# Patient Record
Sex: FEMALE | Race: WHITE | NOT HISPANIC OR LATINO
[De-identification: names, ages, dates, MRNs, and addresses within clinical notes are randomized per-mention and may not be internally consistent; named-entity substitution may affect disease eponyms.]

---

## 2017-03-22 ENCOUNTER — APPOINTMENT (OUTPATIENT)
Dept: SURGERY | Facility: CLINIC | Age: 67
End: 2017-03-22
Payer: MEDICARE

## 2017-03-22 PROCEDURE — 36415 COLL VENOUS BLD VENIPUNCTURE: CPT

## 2017-03-22 PROCEDURE — 99213 OFFICE O/P EST LOW 20 MIN: CPT | Mod: 25

## 2017-03-24 LAB
T3 SERPL-MCNC: 88 NG/DL
T4 FREE SERPL-MCNC: 1.7 NG/DL
TSH SERPL-ACNC: 0.76 UIU/ML

## 2017-04-25 ENCOUNTER — FORM ENCOUNTER (OUTPATIENT)
Age: 67
End: 2017-04-25

## 2017-04-26 ENCOUNTER — APPOINTMENT (OUTPATIENT)
Dept: ULTRASOUND IMAGING | Facility: CLINIC | Age: 67
End: 2017-04-26

## 2017-04-26 ENCOUNTER — OUTPATIENT (OUTPATIENT)
Dept: OUTPATIENT SERVICES | Facility: HOSPITAL | Age: 67
LOS: 1 days | End: 2017-04-26
Payer: MEDICARE

## 2017-04-26 DIAGNOSIS — Z00.8 ENCOUNTER FOR OTHER GENERAL EXAMINATION: ICD-10-CM

## 2017-04-26 PROCEDURE — 76536 US EXAM OF HEAD AND NECK: CPT

## 2018-01-15 ENCOUNTER — RX RENEWAL (OUTPATIENT)
Age: 68
End: 2018-01-15

## 2018-03-19 ENCOUNTER — APPOINTMENT (OUTPATIENT)
Dept: SURGERY | Facility: CLINIC | Age: 68
End: 2018-03-19
Payer: MEDICARE

## 2018-03-19 PROCEDURE — 99213 OFFICE O/P EST LOW 20 MIN: CPT

## 2018-03-19 RX ORDER — TERCONAZOLE 8 MG/G
0.8 CREAM VAGINAL
Qty: 20 | Refills: 0 | Status: DISCONTINUED | COMMUNITY
Start: 2017-11-01

## 2018-03-19 RX ORDER — DOXYCYCLINE HYCLATE 100 MG/1
100 TABLET ORAL
Qty: 20 | Refills: 0 | Status: DISCONTINUED | COMMUNITY
Start: 2017-11-07

## 2018-03-19 RX ORDER — CEFUROXIME AXETIL 500 MG/1
500 TABLET ORAL
Qty: 20 | Refills: 0 | Status: DISCONTINUED | COMMUNITY
Start: 2017-09-29

## 2019-01-04 ENCOUNTER — RX RENEWAL (OUTPATIENT)
Age: 69
End: 2019-01-04

## 2019-03-06 ENCOUNTER — APPOINTMENT (OUTPATIENT)
Dept: SURGERY | Facility: CLINIC | Age: 69
End: 2019-03-06
Payer: MEDICARE

## 2019-03-06 ENCOUNTER — FORM ENCOUNTER (OUTPATIENT)
Age: 69
End: 2019-03-06

## 2019-03-06 PROCEDURE — 99213 OFFICE O/P EST LOW 20 MIN: CPT

## 2019-03-06 NOTE — PHYSICAL EXAM
[de-identified] : No cervical or subclavicular adenopathy, trachea midline, well-healed incision with right 3 cm nodularity. No other masses noted. Stable exam. [Normal] : cranial nerves 2-12 intact

## 2019-03-06 NOTE — HISTORY OF PRESENT ILLNESS
[de-identified] : Patient underwent a left thyroid lobectomy And partial right thyroid lobectomy in 1995 4 benign multinodular goiter. Last ultrasound May 2015 without appreciable change. Patient denies dysphagia, change in voice or palpitations. Repeat blood work August 2015 TSH is 0.7, total T4 9.8. Patient did not have thyroid levels done during August 2016 physical exam. Denies change in energy or weight.\par Recent jose stable.  denies recent illness.   last US 4/2017 stable

## 2019-03-07 ENCOUNTER — OUTPATIENT (OUTPATIENT)
Dept: OUTPATIENT SERVICES | Facility: HOSPITAL | Age: 69
LOS: 1 days | End: 2019-03-07
Payer: MEDICARE

## 2019-03-07 ENCOUNTER — APPOINTMENT (OUTPATIENT)
Dept: ULTRASOUND IMAGING | Facility: CLINIC | Age: 69
End: 2019-03-07
Payer: MEDICARE

## 2019-03-07 DIAGNOSIS — Z00.8 ENCOUNTER FOR OTHER GENERAL EXAMINATION: ICD-10-CM

## 2019-03-07 PROCEDURE — 76536 US EXAM OF HEAD AND NECK: CPT | Mod: 26

## 2019-03-07 PROCEDURE — 76536 US EXAM OF HEAD AND NECK: CPT

## 2019-03-11 ENCOUNTER — APPOINTMENT (OUTPATIENT)
Dept: SURGERY | Facility: CLINIC | Age: 69
End: 2019-03-11

## 2020-03-16 ENCOUNTER — APPOINTMENT (OUTPATIENT)
Dept: SURGERY | Facility: CLINIC | Age: 70
End: 2020-03-16
Payer: MEDICARE

## 2020-03-16 PROCEDURE — 99213 OFFICE O/P EST LOW 20 MIN: CPT

## 2020-03-16 NOTE — HISTORY OF PRESENT ILLNESS
[de-identified] : Patient underwent a left thyroid lobectomy And partial right thyroid lobectomy in 1995 4 benign multinodular goiter. Last ultrasound May 2015 without appreciable change. Patient denies dysphagia, change in voice or palpitations. Repeat blood work August 2015 TSH is 0.7, total T4 9.8. Denies change in energy or weight.\par Alexandrea 2/2020 stable TFTs.  denies recent illness.   US 3/2019 stable

## 2020-03-16 NOTE — PHYSICAL EXAM
[de-identified] : No cervical or subclavicular adenopathy, trachea midline, well-healed incision with right 3 cm nodularity. No other masses noted. Stable exam. [Normal] : no neck adenopathy [de-identified] : Skin:  normal appearance.  no rash, nodules, vesicles, or erythema,\par Musculoskeletal:  full range of motion and no deformities appreciated\par Neurological:  grossly intact\par Psychiatric:  oriented to person, place and time with appropriate affect

## 2020-06-23 DIAGNOSIS — Z01.818 ENCOUNTER FOR OTHER PREPROCEDURAL EXAMINATION: ICD-10-CM

## 2020-06-27 ENCOUNTER — APPOINTMENT (OUTPATIENT)
Dept: DISASTER EMERGENCY | Facility: CLINIC | Age: 70
End: 2020-06-27

## 2020-06-28 LAB — SARS-COV-2 N GENE NPH QL NAA+PROBE: NOT DETECTED

## 2020-12-02 ENCOUNTER — RESULT REVIEW (OUTPATIENT)
Age: 70
End: 2020-12-02

## 2021-03-30 ENCOUNTER — APPOINTMENT (OUTPATIENT)
Dept: SURGERY | Facility: CLINIC | Age: 71
End: 2021-03-30
Payer: MEDICARE

## 2021-03-30 ENCOUNTER — RESULT REVIEW (OUTPATIENT)
Age: 71
End: 2021-03-30

## 2021-03-30 PROCEDURE — 99213 OFFICE O/P EST LOW 20 MIN: CPT

## 2021-03-30 RX ORDER — AMOXICILLIN 500 MG/1
500 TABLET, FILM COATED ORAL
Qty: 28 | Refills: 0 | Status: DISCONTINUED | COMMUNITY
Start: 2021-03-10

## 2021-03-30 RX ORDER — PRAVASTATIN SODIUM 20 MG/1
20 TABLET ORAL
Qty: 30 | Refills: 0 | Status: ACTIVE | COMMUNITY
Start: 2021-03-15

## 2021-03-30 RX ORDER — ESCITALOPRAM OXALATE 5 MG/1
5 TABLET ORAL
Qty: 30 | Refills: 0 | Status: ACTIVE | COMMUNITY
Start: 2021-02-12

## 2021-03-30 NOTE — HISTORY OF PRESENT ILLNESS
[de-identified] : Patient underwent a left thyroid lobectomy And partial right thyroid lobectomy in 1995 4 benign multinodular goiter. Last ultrasound May 2015 without appreciable change. Patient denies dysphagia, change in voice or palpitations. Repeat blood work August 2015 TSH is 0.7, total T4 9.8. Denies change in energy or weight.\par Patient with hx of multiple prior thyroid surgeries and MNG.  Alexandrea 2/2021 stable TFTs.  denies dysphagia, hoarseness, SOB or neck mass or  recent illness.   US 3/2019 stable.  I have reviewed all old and new data and available images.

## 2021-03-30 NOTE — PHYSICAL EXAM
[Normal] : no neck adenopathy [de-identified] : No cervical or subclavicular adenopathy, trachea midline, well-healed incision with right 3 cm nodularity. No other masses noted. Stable exam. [de-identified] : Skin:  normal appearance.  no rash, nodules, vesicles, or erythema,\par Musculoskeletal:  full range of motion and no deformities appreciated\par Neurological:  grossly intact\par Psychiatric:  oriented to person, place and time with appropriate affect

## 2021-04-06 ENCOUNTER — OUTPATIENT (OUTPATIENT)
Dept: OUTPATIENT SERVICES | Facility: HOSPITAL | Age: 71
LOS: 1 days | End: 2021-04-06
Payer: MEDICARE

## 2021-04-06 ENCOUNTER — APPOINTMENT (OUTPATIENT)
Dept: ULTRASOUND IMAGING | Facility: CLINIC | Age: 71
End: 2021-04-06
Payer: MEDICARE

## 2021-04-06 DIAGNOSIS — R89.0 ABNORMAL LEVEL OF ENZYMES IN SPECIMENS FROM OTHER ORGANS, SYSTEMS AND TISSUES: ICD-10-CM

## 2021-04-06 PROCEDURE — 76536 US EXAM OF HEAD AND NECK: CPT | Mod: 26

## 2021-04-06 PROCEDURE — 76536 US EXAM OF HEAD AND NECK: CPT

## 2021-04-15 ENCOUNTER — NON-APPOINTMENT (OUTPATIENT)
Age: 71
End: 2021-04-15

## 2022-04-04 ENCOUNTER — APPOINTMENT (OUTPATIENT)
Dept: ORTHOPEDIC SURGERY | Facility: CLINIC | Age: 72
End: 2022-04-04

## 2022-04-14 ENCOUNTER — APPOINTMENT (OUTPATIENT)
Dept: SURGERY | Facility: CLINIC | Age: 72
End: 2022-04-14
Payer: MEDICARE

## 2022-04-14 PROCEDURE — 99213 OFFICE O/P EST LOW 20 MIN: CPT

## 2022-04-14 NOTE — PHYSICAL EXAM
[de-identified] : No cervical or subclavicular adenopathy, trachea midline, well-healed incision with right 3 cm nodularity. No other masses noted. Stable exam. [Normal] : no neck adenopathy [de-identified] : Skin:  normal appearance.  no rash, nodules, vesicles, or erythema,\par Musculoskeletal:  full range of motion and no deformities appreciated\par Neurological:  grossly intact\par Psychiatric:  oriented to person, place and time with appropriate affect

## 2022-04-14 NOTE — HISTORY OF PRESENT ILLNESS
[de-identified] : Patient underwent a left thyroid lobectomy And partial right thyroid lobectomy in 1995 4 benign multinodular goiter. Last ultrasound May 2015 without appreciable change. Patient denies dysphagia, change in voice or palpitations. Repeat blood work August 2015 TSH is 0.7, total T4 9.8. Denies change in energy or weight.\par Patient with hx of multiple prior thyroid surgeries and MNG.  Jose 2/2021 stable TFTs.  denies dysphagia, hoarseness, SOB or neck mass or  recent illness.   US 4/2021 stable.  denies recent illness. jose 3/2022 TSH 0.75, T4 9.5. I have reviewed all old and new data and available images.

## 2022-04-14 NOTE — ASSESSMENT
[FreeTextEntry1] : no appreciable change on PE or prior imaging, synthroid renewed,  RTo 1 year thyroid US 3/2023.  I have answered their questions to the best of my ability.\par

## 2022-07-18 ENCOUNTER — APPOINTMENT (OUTPATIENT)
Dept: GASTROENTEROLOGY | Facility: CLINIC | Age: 72
End: 2022-07-18

## 2022-07-29 ENCOUNTER — APPOINTMENT (OUTPATIENT)
Dept: GASTROENTEROLOGY | Facility: CLINIC | Age: 72
End: 2022-07-29

## 2022-07-29 VITALS
DIASTOLIC BLOOD PRESSURE: 76 MMHG | SYSTOLIC BLOOD PRESSURE: 120 MMHG | HEART RATE: 74 BPM | WEIGHT: 174 LBS | HEIGHT: 68 IN | OXYGEN SATURATION: 98 % | TEMPERATURE: 97.7 F | BODY MASS INDEX: 26.37 KG/M2

## 2022-07-29 PROCEDURE — 99214 OFFICE O/P EST MOD 30 MIN: CPT

## 2022-07-29 NOTE — HISTORY OF PRESENT ILLNESS
[FreeTextEntry1] : I saw your patient Diana Aguilera in the office today.  Patient is a 72-year-old female who has a history of hypertension,  hypothyroidism and hyperlipidemia.  Patient has been seen in his office for colorectal cancer screening and has known diverticulosis.  The patient had no adenomatous polyps but was noted to have a redundant colon.  Patient's bowel movements are erratic where she may skip 2 to 3 days and is currently noticing intermittent episodes of abdominal cramping followed by several loose bowel movements and incomplete evacuation.  After these attacks the patient does revert back to constipation.  The patient has tried fiber but it made her bloating worse.  There is no blood in the stool or on the toilet tissue.  The patient does admit to being under a lot of stress and notices that this does exacerbate her symptoms.  Patient does not abuse tobacco caffeine or ethanol.  The patient is status post bilateral mastectomy.

## 2022-07-29 NOTE — ASSESSMENT
[FreeTextEntry1] : Patient is a 72-year-old female with a history of hypertension hypothyroidism and hyperlipidemia.  Patient has known diverticulosis and a redundant colon.  The patient has chronic bowel issues and the longevity of the symptoms in the absence of red flag signs such as unexplained weight loss blood in the stool or significant abdominal pain speak for functional bowel issues.  It is likely that if we were able to regulate the patient's bowel movements and avoid constipation she would not swing over to these episodes of abdominal cramping and loose stools.  In this regard I told the patient to start a high potency probiotic, soluble fiber in the form of Benefiber and MiraLAX every other day.  The patient may benefit from an antispasmodic in the future but I am curious to see if regulating her bowel movements prevent these episodes of spasm.  The patient will get back to me with a progress report in 2 to 3 weeks.

## 2022-10-14 ENCOUNTER — APPOINTMENT (OUTPATIENT)
Dept: GASTROENTEROLOGY | Facility: CLINIC | Age: 72
End: 2022-10-14

## 2022-10-14 VITALS
HEIGHT: 68 IN | OXYGEN SATURATION: 96 % | BODY MASS INDEX: 26.67 KG/M2 | WEIGHT: 176 LBS | SYSTOLIC BLOOD PRESSURE: 130 MMHG | HEART RATE: 86 BPM | TEMPERATURE: 97.1 F | DIASTOLIC BLOOD PRESSURE: 84 MMHG

## 2022-10-14 DIAGNOSIS — Z86.79 PERSONAL HISTORY OF OTHER DISEASES OF THE CIRCULATORY SYSTEM: ICD-10-CM

## 2022-10-14 DIAGNOSIS — K58.9 IRRITABLE BOWEL SYNDROME W/OUT DIARRHEA: ICD-10-CM

## 2022-10-14 DIAGNOSIS — R10.11 RIGHT UPPER QUADRANT PAIN: ICD-10-CM

## 2022-10-14 DIAGNOSIS — R10.13 EPIGASTRIC PAIN: ICD-10-CM

## 2022-10-14 DIAGNOSIS — K57.90 DIVERTICULOSIS OF INTESTINE, PART UNSPECIFIED, W/OUT PERFORATION OR ABSCESS W/OUT BLEEDING: ICD-10-CM

## 2022-10-14 PROCEDURE — 99214 OFFICE O/P EST MOD 30 MIN: CPT

## 2022-10-14 NOTE — REVIEW OF SYSTEMS
[Fever] : no fever [Chills] : no chills [Recent Weight Loss (___ Lbs)] : no recent weight loss [Negative] : Respiratory [FreeTextEntry4] : Patient complains of mild discomfort in the left ear the patient has known history of postnasal drip. [FreeTextEntry7] : Transient upper abdominal discomfort.

## 2022-10-14 NOTE — PHYSICAL EXAM
[Alert] : alert [Healthy Appearing] : healthy appearing [No Acute Distress] : no acute distress [Normal] : normal bowel sounds, non-tender, no masses, soft, no no hepato-splenomegaly [de-identified] : TMs were normal bilaterally.  There was mild erythema of the posterior pharynx and nares with no congestion.

## 2022-10-14 NOTE — ASSESSMENT
[FreeTextEntry1] : Mrs. Aguilera is a 72-year-old female with a history of hypertension, hyperlipidemia and hypothyroidism.  From the gastrointestinal perspective the patient has a known redundant colon with irritable bowel and chronic intermittent constipation.  The patient's bowel movements are currently acceptable.  The patient did have a self-limiting episode of upper abdominal discomfort which appears to be more colonic in nature.  It is unlikely that it is biliary or related to the stomach.  Since the discomfort has resolved I do not feel she needs medication at the present time.  For completeness sake the patient was sent for an abdominal sonogram to rule out the remote possibility of atypical biliary or hepatic issues.  The patient will call me with any recurrence of the discomfort.

## 2022-10-14 NOTE — HISTORY OF PRESENT ILLNESS
[FreeTextEntry1] : I saw your patient Diana Aguilera in the office today.  The patient is a 72-year-old female who has a history of hypertension and hyperlipidemia.  The patient is on Synthroid after surgery for multinodular goiter.  Patient has been seen in this office for colorectal cancer screening and has known irritable bowel, diverticulosis and redundant colon.  The patient was seen earlier in the year and given a regimen of MiraLAX along with probiotics which is greatly helped her in her bowel movements.  Patient's lower abdominal discomfort has subsided.  Most recently the patient noticed a band like discomfort in the umbilical area.  There was no associated nausea vomiting fever or chills.  With no relief with the passage of flatus or bowel movements.  The pain has since subsided.  Diana does not abuse tobacco caffeine or ethanol.  The patient had a colonoscopy done less than 5 years ago.  The patient has a history of a bilateral mastectomy.  The patient complains of mild left ear discomfort as well on today's visit.

## 2023-01-19 ENCOUNTER — APPOINTMENT (OUTPATIENT)
Dept: ORTHOPEDIC SURGERY | Facility: CLINIC | Age: 73
End: 2023-01-19
Payer: MEDICARE

## 2023-01-19 ENCOUNTER — APPOINTMENT (OUTPATIENT)
Dept: ORTHOPEDIC SURGERY | Facility: CLINIC | Age: 73
End: 2023-01-19

## 2023-01-19 VITALS — BODY MASS INDEX: 27.28 KG/M2 | WEIGHT: 180 LBS | HEIGHT: 68 IN

## 2023-01-19 DIAGNOSIS — M70.42 PREPATELLAR BURSITIS, LEFT KNEE: ICD-10-CM

## 2023-01-19 DIAGNOSIS — S80.02XA CONTUSION OF LEFT KNEE, INITIAL ENCOUNTER: ICD-10-CM

## 2023-01-19 PROCEDURE — 99203 OFFICE O/P NEW LOW 30 MIN: CPT

## 2023-01-19 PROCEDURE — 73564 X-RAY EXAM KNEE 4 OR MORE: CPT | Mod: LT

## 2023-01-19 NOTE — IMAGING
[Left] : left knee [All Views] : anteroposterior, lateral, skyline, and anteroposterior standing [There are no fractures, subluxations or dislocations. No significant abnormalities are seen] : There are no fractures, subluxations or dislocations. No significant abnormalities are seen [Degenerative change] : Degenerative change

## 2023-01-19 NOTE — HISTORY OF PRESENT ILLNESS
[0] : 0 [de-identified] : 01/19/2023 Ms. ERMA ROCHE, a 72 year old female, presents today for left knee. Reports that 2 weeks ago she fell to the ground, landing on her left knee. Reports left knee swelling and bruising. Has pain and difficulty with kneeling. Has been able to remain active with walking \par Retired. \par  [] : no [FreeTextEntry1] : Left knee [FreeTextEntry5] : ERMA ROCHE is a 72 year old F here for an evaluation of the left knee. Pt states that she fell on her knee about two weeks ago. She says she has no pain, but the knee is still swollen and black and blue, and she wanted to get it looked at. She only had pain while kneeling on it. [de-identified] : Kneeling

## 2023-01-19 NOTE — DISCUSSION/SUMMARY
[de-identified] : 72f s/p fall with contusion of the left knee and prepatellar bursitis\par 1) discussed activity modifications, avoid kneeling\par 2) cryotherapy, rest and activity modification \par 3) can continue with walking exercise daily\par 4) nsaids prn\par 5) rtc prn, if pain persists or worsens will consider csi\par \par Entered by Catherine Mccormick acting as scribe.\par

## 2023-01-19 NOTE — PHYSICAL EXAM
[Left] : left knee [NL (0)] : extension 0 degrees [5___] : quadriceps 5[unfilled]/5 [5mm] : openinmm [] : patient ambulates without assistive device [TWNoteComboBox7] : flexion 130 degrees

## 2023-03-13 ENCOUNTER — APPOINTMENT (OUTPATIENT)
Dept: ORTHOPEDIC SURGERY | Facility: CLINIC | Age: 73
End: 2023-03-13
Payer: MEDICARE

## 2023-03-13 VITALS — BODY MASS INDEX: 27.13 KG/M2 | HEIGHT: 68 IN | WEIGHT: 179 LBS

## 2023-03-13 PROCEDURE — 73564 X-RAY EXAM KNEE 4 OR MORE: CPT | Mod: RT

## 2023-03-13 PROCEDURE — 99214 OFFICE O/P EST MOD 30 MIN: CPT

## 2023-03-13 RX ORDER — NAPROXEN 500 MG/1
500 TABLET ORAL TWICE DAILY
Qty: 60 | Refills: 0 | Status: ACTIVE | COMMUNITY
Start: 2023-03-13 | End: 1900-01-01

## 2023-03-13 NOTE — ASSESSMENT
[FreeTextEntry1] : Right X-Ray Examination of the KNEE (4 views): medial and patellofemoral degenerate changes.\par \par .\par \par - We discussed their diagnosis and treatment options at length including the risks and benefits of both surgical treatment with a knee replacement and non-surgical options.\par - We will continue conservative treatment with activity modification, PT, icing, weight loss, and anti-inflammatory medications.\par - The patient was provided with a PT prescription to work on ROM, hip ER/abductors strengthening, quad/hamstring stretches and strengthening, and other exercises.\par - The patient was advised to let pain guide the gradual advancement of activities.\par - We discussed the possible of injections in the future.\par - naproyn rx\par - Patient was given a prescription for an anti-inflammatory medication.  They will take it for the next week and then on an as needed basis, as long as there are no medical contra-indications.  Patient is counseled on possible GI, renal, and cardiovascular side effects.\par - Follow up as needed in 6 weeks as to re-evaluate\par \par \par \par

## 2023-03-13 NOTE — HISTORY OF PRESENT ILLNESS
[de-identified] : 73 year old female  (retired  )  chronic right knee pain worsening  since 3/10/2023 with no GRETA\par The pain is located  posterior, medial and deep\par The pain is associated with stiffness, swelling, burnin\par Worse with activtiy knee and better at rest.\par tried activity mod\par \par

## 2023-03-13 NOTE — IMAGING

## 2023-03-16 ENCOUNTER — APPOINTMENT (OUTPATIENT)
Dept: ORTHOPEDIC SURGERY | Facility: CLINIC | Age: 73
End: 2023-03-16

## 2023-05-02 ENCOUNTER — NON-APPOINTMENT (OUTPATIENT)
Age: 73
End: 2023-05-02

## 2023-05-08 ENCOUNTER — APPOINTMENT (OUTPATIENT)
Dept: ORTHOPEDIC SURGERY | Facility: CLINIC | Age: 73
End: 2023-05-08
Payer: MEDICARE

## 2023-05-08 DIAGNOSIS — M17.11 UNILATERAL PRIMARY OSTEOARTHRITIS, RIGHT KNEE: ICD-10-CM

## 2023-05-08 DIAGNOSIS — M17.12 UNILATERAL PRIMARY OSTEOARTHRITIS, LEFT KNEE: ICD-10-CM

## 2023-05-08 PROCEDURE — 20610 DRAIN/INJ JOINT/BURSA W/O US: CPT | Mod: LT

## 2023-05-08 PROCEDURE — 99214 OFFICE O/P EST MOD 30 MIN: CPT | Mod: 25

## 2023-05-08 PROCEDURE — 73564 X-RAY EXAM KNEE 4 OR MORE: CPT | Mod: LT

## 2023-05-08 NOTE — HISTORY OF PRESENT ILLNESS
[de-identified] : 73 year old female  (retired  )  chronic right > left knee pain worsening  since 3/10/2023 with no GRETA\par The pain is located  posterior, medial and deep\par The pain is associated with stiffness, swelling, burnin\par Worse with activtiy knee and better at rest.\par tried activity mod\par \par 5/8/23 - seen for right knee in past and sent for PT, icing, nsaids, now left knee acting up on her compensating, trouble bendning\par \par \par

## 2023-05-08 NOTE — ASSESSMENT
[FreeTextEntry1] : LEFT X-Ray Examination of the KNEE (4 views): medial and patellofemoral degenerate changes. medial calc by mcl\par \par \par - We discussed their diagnosis and treatment options at length including the risks and benefits of both surgical treatment with a knee replacement and non-surgical options.\par - We will continue conservative treatment with activity modification, PT, icing, weight loss, and anti-inflammatory medications.\par - The patient was provided with a PT prescription to work on ROM, hip ER/abductors strengthening, quad/hamstring stretches and strengthening, and other exercises \par - The patient was advised to let pain guide the gradual advancement of activities. \par - We also discussed the possible of a corticosteroid injection in order to help decrease inflammation and pain so that they can perform better therapy.\par - The risks, benefits, and alternatives to corticosteroid injection were reviewed with the patient and they wished to proceed with this treatment course. \par - Follow up as needed in 6 weeks to re-evaluate, if no improvement we spoke about possibility of viscosupplementation injections\par \par \par \par

## 2023-05-08 NOTE — IMAGING
[de-identified] : \par RIGHT KNEE\par Inspection:  mild effusion\par Palpation: medial joint line tenderness, anterior tenderness\par Knee Range of Motion:  0-125 \par Strength: 5/5 Quadriceps strength, 5/5 Hamstring strength\par Neurological: light touch is intact throughout\par Ligament Stability and Special Tests: \par McMurrays: neg\par Lachman: neg\par Pivot Shift: neg\par Posterior Drawer: neg\par Valgus: neg\par Varus: neg\par Patella Apprehension: neg\par Patella Maltracking: neg\par \par \par LEFT KNEE\par Inspection:  mild effusion\par Palpation: medial joint line tenderness, anterior tenderness\par Knee Range of Motion:  0-125 \par Strength: 5/5 Quadriceps strength, 5/5 Hamstring strength\par Neurological: light touch is intact throughout\par Ligament Stability and Special Tests: \par McMurrays: neg\par Lachman: neg\par Pivot Shift: neg\par Posterior Drawer: neg\par Valgus: neg\par Varus: neg\par Patella Apprehension: neg\par Patella Maltracking: neg\par

## 2023-05-18 ENCOUNTER — APPOINTMENT (OUTPATIENT)
Dept: SURGERY | Facility: CLINIC | Age: 73
End: 2023-05-18
Payer: MEDICARE

## 2023-05-18 PROCEDURE — 36415 COLL VENOUS BLD VENIPUNCTURE: CPT

## 2023-05-18 PROCEDURE — 99213 OFFICE O/P EST LOW 20 MIN: CPT

## 2023-05-18 NOTE — PHYSICAL EXAM
[Normal] : no neck adenopathy [de-identified] : No cervical or subclavicular adenopathy, trachea midline, well-healed incision with right 3 cm nodularity. No other masses noted. Stable exam. [de-identified] : Skin:  normal appearance.  no rash, nodules, vesicles, or erythema,\par Musculoskeletal:  full range of motion and no deformities appreciated\par Neurological:  grossly intact\par Psychiatric:  oriented to person, place and time with appropriate affect

## 2023-05-18 NOTE — HISTORY OF PRESENT ILLNESS
[de-identified] : Patient underwent a left thyroid lobectomy And partial right thyroid lobectomy in 1995 4 benign multinodular goiter. Last ultrasound May 2015 without appreciable change. Patient denies dysphagia, change in voice or palpitations. Repeat blood work August 2015 TSH is 0.7, total T4 9.8. Denies change in energy or weight.\par Patient with hx of multiple prior thyroid surgeries and MNG.  denies dysphagia, hoarseness, SOB or neck mass or  recent illness.   US 4/2023  stable.  denies recent illness. jose 3/2022 TSH 0.75, T4 9.5.   Patient with increased anxiety, blood work October 2022 with elevated T4.  Dose was not adjusted.  I have reviewed all old and new data and available images.

## 2023-05-18 NOTE — ASSESSMENT
[FreeTextEntry1] : no appreciable change on PE or prior imaging, synthroid renewed,  RTo 1 year thyroid US 4/2024.  I have answered their questions to the best of my ability.\par

## 2023-05-22 ENCOUNTER — NON-APPOINTMENT (OUTPATIENT)
Age: 73
End: 2023-05-22

## 2023-05-22 LAB
T3 SERPL-MCNC: 77 NG/DL
T4 FREE SERPL-MCNC: 1.8 NG/DL
TSH SERPL-ACNC: 1.5 UIU/ML

## 2023-07-27 ENCOUNTER — RX RENEWAL (OUTPATIENT)
Age: 73
End: 2023-07-27

## 2024-01-22 ENCOUNTER — APPOINTMENT (OUTPATIENT)
Dept: GASTROENTEROLOGY | Facility: CLINIC | Age: 74
End: 2024-01-22

## 2024-05-06 ENCOUNTER — APPOINTMENT (OUTPATIENT)
Dept: ULTRASOUND IMAGING | Facility: CLINIC | Age: 74
End: 2024-05-06
Payer: MEDICARE

## 2024-05-06 ENCOUNTER — OUTPATIENT (OUTPATIENT)
Dept: OUTPATIENT SERVICES | Facility: HOSPITAL | Age: 74
LOS: 1 days | End: 2024-05-06
Payer: MEDICARE

## 2024-05-06 DIAGNOSIS — Z00.8 ENCOUNTER FOR OTHER GENERAL EXAMINATION: ICD-10-CM

## 2024-05-06 PROCEDURE — 76536 US EXAM OF HEAD AND NECK: CPT | Mod: 26

## 2024-05-06 PROCEDURE — 76536 US EXAM OF HEAD AND NECK: CPT

## 2024-05-11 ENCOUNTER — TRANSCRIPTION ENCOUNTER (OUTPATIENT)
Age: 74
End: 2024-05-11

## 2024-05-16 ENCOUNTER — APPOINTMENT (OUTPATIENT)
Dept: SURGERY | Facility: CLINIC | Age: 74
End: 2024-05-16
Payer: MEDICARE

## 2024-05-16 DIAGNOSIS — E04.2 NONTOXIC MULTINODULAR GOITER: ICD-10-CM

## 2024-05-16 DIAGNOSIS — E89.0 POSTPROCEDURAL HYPOTHYROIDISM: ICD-10-CM

## 2024-05-16 PROCEDURE — 99213 OFFICE O/P EST LOW 20 MIN: CPT

## 2024-05-16 NOTE — ASSESSMENT
[FreeTextEntry1] : no appreciable change on PE or prior imaging,yamila had blood work with PCP who will renew her medication.  she will continue under his care and contact my office if change develops.   I have answered their questions to the best of my ability.

## 2024-05-16 NOTE — PHYSICAL EXAM
[de-identified] : No cervical or subclavicular adenopathy, trachea midline, well-healed incision with right 3 cm nodularity. No other masses noted. Stable exam. [Normal] : no neck adenopathy [de-identified] : Skin:  normal appearance.  no rash, nodules, vesicles, or erythema,\par  Musculoskeletal:  full range of motion and no deformities appreciated\par  Neurological:  grossly intact\par  Psychiatric:  oriented to person, place and time with appropriate affect

## 2024-05-16 NOTE — HISTORY OF PRESENT ILLNESS
[de-identified] : Patient underwent a left thyroid lobectomy And partial right thyroid lobectomy in 1995 4 benign multinodular goiter. Last ultrasound May 2015 without appreciable change. Patient denies dysphagia, change in voice or palpitations. Repeat blood work August 2015 TSH is 0.7, total T4 9.8. Denies change in energy or weight. Patient with hx of multiple prior thyroid surgeries and MNG.  denies dysphagia, hoarseness, SOB or neck mass or  recent illness.   US 4/2023 and 5/2024 stable.  denies recent illness. jose 3/2022 TSH 0.75, T4 9.5.   Patient with increased anxiety, blood work October 2022 with elevated T4.  Dose was not adjusted.  I have reviewed all old and new data and available images.

## 2024-10-22 ENCOUNTER — RX RENEWAL (OUTPATIENT)
Age: 74
End: 2024-10-22

## 2025-04-17 ENCOUNTER — RX RENEWAL (OUTPATIENT)
Age: 75
End: 2025-04-17